# Patient Record
Sex: FEMALE
[De-identification: names, ages, dates, MRNs, and addresses within clinical notes are randomized per-mention and may not be internally consistent; named-entity substitution may affect disease eponyms.]

---

## 2022-01-03 ENCOUNTER — NURSE TRIAGE (OUTPATIENT)
Dept: OTHER | Facility: CLINIC | Age: 62
End: 2022-01-03

## 2022-02-23 ENCOUNTER — NURSE TRIAGE (OUTPATIENT)
Dept: OTHER | Facility: CLINIC | Age: 62
End: 2022-02-23

## 2022-02-23 NOTE — TELEPHONE ENCOUNTER
Subjective: Caller states \"Im back in [facility]\"     Current Symptoms: Had cardiac ablation 9 days ago and cardioversion today. Feels like she is back in Afib now but all she was told was to start Metoprolol. She was not given a directive if she reverted back to Afib. She is mildly short of breath but says that is how she feels in Afib, no chest pain, no dizziness or sweating. Writer attempted to reach provider on call but their line place call on hold and then disconnected. Patient states it did the same to her prior to calling nurse triage. Writer called again and transferred call with oncall  with patient to get oncall provider to call her as she declined to go to ED. Onset: 2 hours ago; sudden    Associated Symptoms: NA    Pain Severity: 0/10; N/A; none    Temperature: no fever by unknown method    What has been tried: nothing    LMP: NA Pregnant: NA    Recommended disposition: go to ED now    Care advice provided, patient verbalizes understanding; denies any other questions or concerns; instructed to call back for any new or worsening symptoms. warm transferred caller to cardiology's (Dr. Melissa Lawler)     This triage is a result of a call to [facility]. Please do not respond to the triage nurse through this encounter. Any subsequent communication should be directly with the patient.             Reason for Disposition   [1] Heart beating very rapidly (e.g., > 140 / minute) AND [2] present now  (Exception: during exercise)    Protocols used: HEART RATE AND HEARTBEAT QUESTIONS-ADULT-